# Patient Record
Sex: FEMALE | Race: WHITE
[De-identification: names, ages, dates, MRNs, and addresses within clinical notes are randomized per-mention and may not be internally consistent; named-entity substitution may affect disease eponyms.]

---

## 2017-04-21 ENCOUNTER — HOSPITAL ENCOUNTER (OUTPATIENT)
Dept: HOSPITAL 56 - MW.CHOBGYN | Age: 27
End: 2017-04-21
Attending: ADVANCED PRACTICE MIDWIFE
Payer: COMMERCIAL

## 2017-04-21 DIAGNOSIS — Z34.90: Primary | ICD-10-CM

## 2017-05-05 ENCOUNTER — HOSPITAL ENCOUNTER (OUTPATIENT)
Dept: HOSPITAL 56 - MW.US | Age: 27
End: 2017-05-05
Attending: ADVANCED PRACTICE MIDWIFE
Payer: COMMERCIAL

## 2017-05-05 DIAGNOSIS — Z34.90: Primary | ICD-10-CM

## 2017-05-08 NOTE — US
EXAM DATE: 17



PATIENT'S AGE: 26



Patient: CARLYLE LUIS



Facility: [place], [place]

Patient ID: [id number]

Site Patient ID: [id number].

Site Accession #: [id number].

: 1990

Study:  OB Pelvis [id number]/5/2017 4:39:43 PM

Ordering Physician: Cesia Renee



Final Report: 

INDICATION:

Fetal anatomical survey 



TECHNIQUE:

Ultrasound OB pelvis transabdominal. Real-time gray-scale imaging of the fetus 
was performed as well as color Doppler and spectral Doppler analysis of the 
umbilical artery.



COMPARISON:

None.



FINDINGS:

Sonographic imaging demonstrates a single living intrauterine gestation. Fetus 
demonstrates a regular cardiac rate of 142 beats per minute. Fetus has a 
cephalic orientation. The placenta lies posterior with borderline previa. 
Amniotic fluid volume appears normal. 



FETAL ANATOMICAL SURVEY:

Lateral ventricles: Normal

Posterior fossa: Normal

Face: Normal

Profile: Normal

Spine: Normal

4 chamber heart: Normal

RVOT: Normal

LVOT: Normal

Diaphragm: Normal

Stomach: Normal

Kidneys: Normal

Bladder: Normal

Cord insertion: Normal

3 vessel cord: Normal

4 extremities: Normal



BIOMETRY DATA:

Biparietal diameter: 5.1 cm, 21 weeks 4 days.

Head circumference: 19.3 cm, 21 weeks 3 days.

Abdominal circumference: 16.3 cm, 21 weeks 3 days.

Femur length: 3.4 cm, 20 weeks 6 days.

H/A ratio is 1.16 which is within the normal range.

EFW is 409 grams, 73%.



Gestational age by LMP is 20 weeks 5 days.

Gestational age by ultrasound is 21 weeks 2 days.

Estimated date of delivery by LMP is 2017.

Estimated date of delivery by ultrasound is 2017.



IMPRESSION:

1.Single viable intrauterine pregnancy. 

2.No intrinsic abnormalities noted on anatomic survey. 

3. Posterior placenta is low-lying. This should be further evaluated on 
subsequent ultrasound exams.



Dictated by Pepito Wray MD @ May 8 2017 7:51AM

(Electronic Signature)



Report Signed by Proxy.
DAY

## 2017-09-14 ENCOUNTER — HOSPITAL ENCOUNTER (INPATIENT)
Dept: HOSPITAL 56 - MW.OBCHECK | Age: 27
LOS: 1 days | Discharge: HOME | End: 2017-09-15
Attending: OBSTETRICS & GYNECOLOGY | Admitting: OBSTETRICS & GYNECOLOGY
Payer: COMMERCIAL

## 2017-09-14 DIAGNOSIS — Z3A.39: ICD-10-CM

## 2017-09-14 NOTE — PCM.LDHP
L&D History of Present Illness





- General


Date of Service: 17


Admit Problem/Dx: 


 Patient Status Order with Admit Dx/Problem





17 13:43


Patient Status [ADT] Routine 








 Admission Diagnosis/Problem











Admission Diagnosis/Problem    Pregnant - planned














17 17:45


27 yo  EDC 2017 at 39 3/7wks/ O neg, RI, GBS neg. Comes to L&D in 

active labor.


Source of Information: Patient


History Limitations: Reports: No Limitations





- History of Present Illness


Pain Score: 5


Improves with: Reports: None


Worsens with: Reports: None


Associated Symptoms: Reports: N





- Related Data


Allergies/Adverse Reactions: 


 Allergies











Allergy/AdvReac Type Severity Reaction Status Date / Time


 


No Known Allergies Allergy   Verified 17 13:40














Past Medical History


OB/GYN History: Reports: Pregnancy





- Infectious Disease History


Infectious Disease History: Reports: Chicken Pox, Mononucleosis





- Past Surgical History


HEENT Surgical History: Reports: Oral Surgery


Musculoskeletal Surgical History: Reports: Shoulder Surgery


Other Musculoskeletal Surgeries/Procedures:: Right shoulder surgery x2.





Social & Family History





- Family History


Cardiac: Reports: Hypertension, MI


OBGYN: Reports: Pregnancy


Psychiatric: Reports: Schizophrenia


Oncologic: Reports: Breast





- Tobacco Use


Smoking Status *Q: Never Smoker


Second Hand Smoke Exposure: No





- Caffeine Use


Caffeine Use: Reports: Coffee





- Recreational Drug Use


Recreational Drug Use: No





H&P Review of Systems





- Review of Systems:


Review Of Systems: See Below


General: Reports: No Symptoms


HEENT: Reports: No Symptoms


Pulmonary: Reports: No Symptoms


Cardiovascular: Reports: No Symptoms


Gastrointestinal: Reports: No Symptoms


Genitourinary: Reports: No Symptoms


Musculoskeletal: Reports: No Symptoms


Skin: Reports: No Symptoms


Psychiatric: Reports: No Symptoms


Neurological: Reports: No Symptoms


Hematologic/Lymphatic: Reports: No Symptoms


Immunologic: Reports: No Symptoms





L&D Exam





- Exam


Exam: See Below





- Vital Signs


Weight: 95.254 kg





- OB Specific


Contraction Intensity: Strong


Fetal Movement: Active


Fetal Heart Tones: Present


Birth Presentation: Vertex





- Exam


General: Alert, Oriented, Cooperative


HEENT: Hearing Intact


Lungs: Normal Respiratory Effort


GI/Abdominal Exam: Soft, Non-Tender, No Organomegaly (gravid)


Rectal Exam: Deferred


Genitourinary: Cervical dilitation


Back Exam: Full Range of Motion


Extremities: Normal Range of Motion, Non-Tender, No Pedal Edema, Normal 

Capillary Refill


Skin: Warm, Dry, Intact


Neurological: Cranial Nerves Intact


Psychiatric: Alert, Normal Affect, Normal Mood





- Patient Data


Lab Results Last 24 hrs: 


 Laboratory Results - last 24 hr











  17 Range/Units





  14:29 14:29 


 


WBC  17.82 H   (4.0-11.0)  K/uL


 


RBC  4.72   (4.30-5.90)  M/uL


 


Hgb  12.3   (12.0-16.0)  g/dL


 


Hct  38.0   (36.0-46.0)  %


 


MCV  80.5   (80.0-98.0)  fL


 


MCH  26.1 L   (27.0-32.0)  pg


 


MCHC  32.4   (31.0-37.0)  g/dL


 


RDW Std Deviation  41.1   (28.0-62.0)  fl


 


RDW Coeff of Burton  14   (11.0-15.0)  %


 


Plt Count  246   (150-400)  K/uL


 


MPV  10.90   (7.40-12.00)  fL


 


Nucleated RBC %  0.0   /100WBC


 


Nucleated RBCs #  0   K/uL


 


Blood Type   O NEGATIVE  


 


Antibody Screen   NEGATIVE  











Result Diagrams: 


 17 14:29








- Problem List


(1) Supervision of normal IUP (intrauterine pregnancy) in primigravida


SNOMED Code(s): 78802605, 010033452, 620194403, 548740940


   ICD Code: Z34.00 - ENCNTR FOR SUPRVSN OF NORMAL FIRST PREGNANCY, UNSP 

TRIMESTER   Status: Acute   Priority: High   Current Visit: Yes   


Qualifiers: 


   Trimester: third trimester   Qualified Code(s): Z34.03 - Encounter for 

supervision of normal first pregnancy, third trimester   


Problem List Initiated/Reviewed/Updated: Yes


Orders Last 24hrs: 


 Active Orders 24 hr











 Category Date Time Status


 


 May Shower [RC] ASDIRECTED Care  17 14:07 Inactive


 


 May Shower [RC] ASDIRECTED Care  17 15:47 Active


 


 Notify Provider [RC] PRN Care  17 14:07 Inactive


 


 Up ad Mary [RC] ASDIRECTED Care  17 13:43 Inactive


 


 Up ad Mary [RC] ASDIRECTED Care  17 14:07 Inactive


 


 Up ad Mary [RC] ASDIRECTED Care  17 15:47 Active


 


 Vital Signs [RC] PER UNIT ROUTINE Care  17 13:43 Inactive


 


 Vital Signs [RC] PER UNIT ROUTINE Care  17 14:07 Inactive


 


 Vital Signs [RC] PER UNIT ROUTINE Care  17 15:47 Active


 


 Regular Diet [DIET] Diet  17 Dinner Active


 


 RHIG WORKUP, POSTPARTUM [BBK] Routine Lab  17 16:53 Received


 


 Acetaminophen [Tylenol Extra Strength] Med  17 15:47 Active





 1,000 mg PO Q4H PRN   


 


 Acetaminophen [Tylenol Extra Strength] Med  17 15:47 Active





 500 mg PO Q4H PRN   


 


 Benzocaine/Menthol [Dermoplast Pain Relief 20%-0.5% Med  17 15:47 Active





 Spray]   





 78 gm TOP ASDIRECTED PRN   


 


 Bisacodyl [Dulcolax] Med  17 15:47 Active





 10 mg RECTAL .ONCE PRN   


 


 Docusate Sodium [Colace] Med  17 15:47 Active





 100 mg PO BID PRN   


 


 Hydrocortisone [Proctozone-HC 2.5% Crm] Med  17 19:00 Active





 1 gm TOP 5XDAY   


 


 Ibuprofen [Motrin] Med  17 15:47 Active





 400 mg PO Q4H PRN   


 


 Ibuprofen [Motrin] Med  17 15:47 Active





 800 mg PO Q6H PRN   


 


 Lanolin [Lansinoh HPA] Med  17 15:47 Active





 See Dose Instructions  TOP ASDIRECTED PRN   


 


 Witch Hazel [Tucks] Med  17 15:47 Active





 1 pad TOP ASDIRECTED PRN   


 


 oxyCODONE Med  17 15:47 Active





 5 mg PO Q2H PRN   


 


 Assess Lochia [WOMSER] Per Unit Routine Oth  17 15:47 Ordered


 


 Assess Uterine Involution [WOMSER] Per Unit Routine Oth  17 15:47 Ordered


 


 Peripheral IV Discontinue [OM.PC] Routine Oth  17 15:47 Ordered


 


 Resuscitation Status Routine Resus Stat  17 15:47 Ordered








 Medication Orders





Acetaminophen (Tylenol Extra Strength)  500 mg PO Q4H PRN


   PRN Reason: Pain


Acetaminophen (Tylenol Extra Strength)  1,000 mg PO Q4H PRN


   PRN Reason: Pain


Benzocaine/Menthol (Dermoplast Pain Relief 20%-0.5% Spray)  78 gm TOP 

ASDIRECTED PRN


   PRN Reason: Perineal Comfort Measure


   Last Admin: 17 16:05  Dose: 78 gm


Bisacodyl (Dulcolax)  10 mg RECTAL .ONCE PRN


   PRN Reason: Constipation


Docusate Sodium (Colace)  100 mg PO BID PRN


   PRN Reason: Constipation


Emollient Ointment (Lansinoh Hpa)  0 gm TOP ASDIRECTED PRN


   PRN Reason: Sore Nipples


Hydrocortisone (Proctozone-Hc 2.5% Crm)  1 gm TOP 5XDAY NATASHA


Ibuprofen (Motrin)  400 mg PO Q4H PRN


   PRN Reason: Pain


Ibuprofen (Motrin)  800 mg PO Q6H PRN


   PRN Reason: Pain


   Last Admin: 17 16:06  Dose: 800 mg


Oxycodone HCl (Oxycodone)  5 mg PO Q2H PRN


   PRN Reason: Pain


Witch Hazel (Tucks)  1 pad TOP ASDIRECTED PRN


   PRN Reason: comfort care


   Last Admin: 17 16:05  Dose: 1 tub








Assessment/Plan Comment:: 





Labor


A: 27 yo  EDC 2017 at 39 3/7wks/ O neg, RI, GBS neg. Comes to L&D in 

active labor.


P: Admit, epidural prn, anticipate NATALIE, Dr Guadalupe updated on pt status

## 2017-09-14 NOTE — PCM.DEL
L & D Note





- General Info


Date of Service: 17


Mother's Due Date: 17





- Delivery Note


Labor: Spontaneous


Delivery Outcome: Livebirth


Infant Delivery Method: Spontaneous Vaginal Delivery-Single


Birth Presentation: Vertex


Nuchal Cord: None


Anesthesia Type: None


Episiotomy Type: None


Laceration: None


Placenta: Intact, Spontaneous


Cord: 3 Vessels


Estimated Blood Loss: 100


Resuscitation Needed: No


[place]: Stimulated


APGAR Score 1 min: 8


APGAR Score 5 min: 10


Second Stage Interventions: Reports: Pushing Effectively, Pushing, Pulls Own 

Legs Back


Delivery Comments (Free Text/Narrative):: 





 of viable male over intact perineum. Head delivered with good pushing, 

shoulder and body followed with good maternal effort. Infant spot cough and cry 

placed on mothers abdomen with RN at  for evaluation. Delayed cord clamping. 

Pitocin to IVF. Cord clamped and cut by FOB. Cord blood collected. Placenta 

delivered grossly intact with gentle traction. Inspection noted intact perineum 

and small upper labial lac that were hemo stable and no repair needed. EBL 100cc

, APGARS 8/10, Wt: 9lb 7o 4270gm, Mother and infant left in stable condition 

for recovery bonding well.





- General Info


Date of Service: 17


Admission Dx/Problem (Free Text): 


 Patient Status Order with Admit Dx/Problem





17 13:43


Patient Status [ADT] Routine 








 Admission Diagnosis/Problem











Admission Diagnosis/Problem    Pregnant - planned














17 17:45


25 yo  EDC 2017 at 39 3/7wks/ O neg, RI, GBS neg. Comes to L&D in 

active labor.


Functional Status: Reports: Pain Controlled





- Review of Systems


General: Reports: No Symptoms


HEENT: Reports: No Symptoms


Pulmonary: Reports: No Symptoms


Cardiovascular: Reports: No Symptoms


Gastrointestinal: Reports: No Symptoms


Genitourinary: Reports: No Symptoms


Musculoskeletal: Reports: No Symptoms


Skin: Reports: No Symptoms


Neurological: Reports: No Symptoms


Psychiatric: Reports: No Symptoms





- Patient Data


Weight - Most Recent: 95.254 kg


Lab Results Last 24 Hours: 


 Laboratory Results - last 24 hr











  17 Range/Units





  14:29 14:29 


 


WBC  17.82 H   (4.0-11.0)  K/uL


 


RBC  4.72   (4.30-5.90)  M/uL


 


Hgb  12.3   (12.0-16.0)  g/dL


 


Hct  38.0   (36.0-46.0)  %


 


MCV  80.5   (80.0-98.0)  fL


 


MCH  26.1 L   (27.0-32.0)  pg


 


MCHC  32.4   (31.0-37.0)  g/dL


 


RDW Std Deviation  41.1   (28.0-62.0)  fl


 


RDW Coeff of Burton  14   (11.0-15.0)  %


 


Plt Count  246   (150-400)  K/uL


 


MPV  10.90   (7.40-12.00)  fL


 


Nucleated RBC %  0.0   /100WBC


 


Nucleated RBCs #  0   K/uL


 


Blood Type   O NEGATIVE  


 


Antibody Screen   NEGATIVE  











Med Orders - Current: 


 Current Medications





Acetaminophen (Tylenol Extra Strength)  500 mg PO Q4H PRN


   PRN Reason: Pain


Acetaminophen (Tylenol Extra Strength)  1,000 mg PO Q4H PRN


   PRN Reason: Pain


Benzocaine/Menthol (Dermoplast Pain Relief 20%-0.5% Spray)  78 gm TOP 

ASDIRECTED PRN


   PRN Reason: Perineal Comfort Measure


   Last Admin: 17 16:05 Dose:  78 gm


Bisacodyl (Dulcolax)  10 mg RECTAL .ONCE PRN


   PRN Reason: Constipation


Docusate Sodium (Colace)  100 mg PO BID PRN


   PRN Reason: Constipation


Emollient Ointment (Lansinoh Hpa)  0 gm TOP ASDIRECTED PRN


   PRN Reason: Sore Nipples


Hydrocortisone (Proctozone-Hc 2.5% Crm)  1 gm TOP 5XDAY NATASHA


Ibuprofen (Motrin)  400 mg PO Q4H PRN


   PRN Reason: Pain


Ibuprofen (Motrin)  800 mg PO Q6H PRN


   PRN Reason: Pain


   Last Admin: 17 16:06 Dose:  800 mg


Oxycodone HCl (Oxycodone)  5 mg PO Q2H PRN


   PRN Reason: Pain


Witch Hazel (Tucks)  1 pad TOP ASDIRECTED PRN


   PRN Reason: comfort care


   Last Admin: 17 16:05 Dose:  1 tub





Discontinued Medications





Butorphanol Tartrate (Stadol)  1 mg IVPUSH Q1H PRN


   PRN Reason: Pain


Carboprost Tromethamine (Hemabate Ds)  250 mcg IM ASDIRECTED PRN


   PRN Reason: Post Partum Hemorrhage


Lactated Ringer's (Ringers, Lactated)  1,000 mls @ 150 mls/hr IV ASDIRECTED NATASHA


   Last Admin: 17 14:45 Dose:  150 mls/hr


Oxytocin/Lactated Ringer's (Pitocin In Lr 30 Units/500 Ml)  30 unit in 500 mls 

@ 999 mls/hr IV TITRATE NATASHA; 999 MUNITS/MIN


   PRN Reason: Protocol


   Stop: 17 14:46


   Last Admin: 17 15:27 Dose:  999 munits/min, 999 mls/hr


Lidocaine HCl (Xylocaine 1%)  50 ml INJECT .ONCE PRN


   PRN Reason: Laceration repair


Methylergonovine Maleate (Methergine)  0.2 mg IM ASDIRECTED PRN


   PRN Reason: Post Partum Hemorrhage


Misoprostol (Cytotec)  200 mcg PO .ONCE PRN


   PRN Reason: Post Partum Hemorrhage


Nalbuphine HCl (Nubain)  10 mg IVPUSH Q1H PRN


   PRN Reason: Pain (severe 7-10)


Sodium Chloride (Saline Flush)  10 ml FLUSH ASDIRECTED PRN


   PRN Reason: Keep Vein Open


Sodium Chloride (Saline Flush)  2.5 ml FLUSH ASDIRECTED PRN


   PRN Reason: Keep Vein Open


Sterile Water (Sterile Water For Irrigation)  1,000 ml IRR ASDIRECTED PRN


   PRN Reason: delivery


   Last Admin: 17 15:27 Dose:  1,000 ml











- Exam


General: Alert, Oriented, Cooperative, No Acute Distress


Lungs: Normal Respiratory Effort


GI/Abdominal Exam: Soft, Non-Tender, No Organomegaly, No Distention, No Mass, 

Pelvis Stable


 (Female) Exam: Normal External Exam, Normal Bimanual Exam, Vaginal Bleeding


Back Exam: Full Range of Motion


Extremities: Normal Range of Motion, Non-Tender, No Pedal Edema, Normal 

Capillary Refill


Skin: Warm, Dry, Intact


Wound/Incisions: Healing Well


Neurological: No New Focal Deficit, Normal Speech, Normal Tone


Psy/Mental Status: Alert, Normal Affect, Normal Mood





- Problem List & Annotations


(1) Supervision of normal IUP (intrauterine pregnancy) in primigravida


SNOMED Code(s): 45879942, 369820542, 391785528, 850822247


   Code(s): Z34.00 - ENCNTR FOR SUPRVSN OF NORMAL FIRST PREGNANCY, UNSP 

TRIMESTER   Status: Acute   Priority: High   Current Visit: Yes   


Qualifiers: 


   Trimester: third trimester   Qualified Code(s): Z34.03 - Encounter for 

supervision of normal first pregnancy, third trimester   





(2)  (normal spontaneous vaginal delivery)


SNOMED Code(s): 69960183


   Code(s): O80 - ENCOUNTER FOR FULL-TERM UNCOMPLICATED DELIVERY   Status: 

Acute   Priority: High   Current Visit: Yes   





- Problem List Review


Problem List Initiated/Reviewed/Updated: Yes





- My Orders


Last 24 Hours: 


My Active Orders





17 14:07


May Shower [RC] ASDIRECTED 


Notify Provider [RC] PRN 


Up ad Mary [RC] ASDIRECTED 


Vital Signs [RC] PER UNIT ROUTINE 





17 15:47


May Shower [RC] ASDIRECTED 


Up ad Mary [RC] ASDIRECTED 


Vital Signs [RC] PER UNIT ROUTINE 


Acetaminophen [Tylenol Extra Strength]   1,000 mg PO Q4H PRN 


Acetaminophen [Tylenol Extra Strength]   500 mg PO Q4H PRN 


Benzocaine/Menthol [Dermoplast Pain Relief 20%-0.5% Spray]   78 gm TOP 

ASDIRECTED PRN 


Bisacodyl [Dulcolax]   10 mg RECTAL .ONCE PRN 


Docusate Sodium [Colace]   100 mg PO BID PRN 


Ibuprofen [Motrin]   400 mg PO Q4H PRN 


Ibuprofen [Motrin]   800 mg PO Q6H PRN 


Lanolin [Lansinoh HPA]   See Dose Instructions  TOP ASDIRECTED PRN 


Witch Hazel [Tucks]   1 pad TOP ASDIRECTED PRN 


oxyCODONE   5 mg PO Q2H PRN 


Assess Lochia [WOMSER] Per Unit Routine 


Assess Uterine Involution [WOMSER] Per Unit Routine 


Peripheral IV Discontinue [OM.PC] Routine 


Resuscitation Status Routine 





17 16:53


RHIG WORKUP, POSTPARTUM [BBK] Routine 





17 19:00


Hydrocortisone [Proctozone-HC 2.5% Crm]   1 gm TOP 5XDAY 





17 Dinner


Regular Diet [DIET] 














- Assessment


Assessment:: 





Delivery:


A:  of viable male over intact perineum. APGARS 8/10, Wt: 9lb 7oz, 4270gm, 

Intact perineum intact, EBL 100cc, Stable





- Plan


Plan:: 





Labor


A: 25 yo  EDC 2017 at 39 3/7wks/ O neg, RI, GBS neg. Comes to L&D in 

active labor.


P: Admit, epidural prn, anticipate SVE, Dr Guadalupe updated on pt status





Delivery


P: Routine pp plan of care

## 2017-09-15 VITALS — DIASTOLIC BLOOD PRESSURE: 72 MMHG | SYSTOLIC BLOOD PRESSURE: 118 MMHG

## 2017-09-15 NOTE — PCM.DCSUM1
**Discharge Summary





- Hospital Course


Free Text/Narrative:: 





Discharge home with infant, follow up 6 weeks or sooner if needed.





- Discharge Data


Discharge Date: 09/15/17


Discharge Disposition: Home, Self-Care 01


Condition: Good





- Discharge Diagnosis/Problem(s)


(1) Supervision of normal IUP (intrauterine pregnancy) in primigravida


SNOMED Code(s): 52939755, 417611249, 924405978, 311884972


   ICD Code: Z34.00 - ENCNTR FOR SUPRVSN OF NORMAL FIRST PREGNANCY, UNSP 

TRIMESTER   Status: Acute   Priority: High   Current Visit: Yes   


Qualifiers: 


   Trimester: third trimester   Qualified Code(s): Z34.03 - Encounter for 

supervision of normal first pregnancy, third trimester   





(2)  (normal spontaneous vaginal delivery)


SNOMED Code(s): 80885905


   ICD Code: O80 - ENCOUNTER FOR FULL-TERM UNCOMPLICATED DELIVERY   Status: 

Acute   Priority: High   Current Visit: Yes   





- Patient Instructions


Diet: Usual Diet as Tolerated


Activity: As Tolerated, Rest and Relax Today


Driving: May Drive Today


Showering/Bathing: May Shower


Notify Provider of: Fever, Increased Pain, Drainage, Nausea and/or Vomiting


Other/Special Instructions: Discharge home with infant, follow up 6 weeks or 

sooner if needed.





- Discharge Plan


Referrals: 


[facility] [Outside]


Thelma Callaway CNM [Mid-Wife] - 10/26/17 3:00 pm





- General Info


Date of Service: 09/15/17


Admission Dx/Problem (Free Text: 


 Patient Status Order with Admit Dx/Problem





17 13:43


Patient Status [ADT] Routine 








 Admission Diagnosis/Problem











Admission Diagnosis/Problem    Pregnant - planned














17 17:45


25 yo  EDC 2017 at 39 3/7wks/ O neg, RI, GBS neg. Comes to L&D in 

active labor.


Functional Status: Reports: Pain Controlled, Tolerating Diet, Ambulating, 

Urinating





- Review of Systems


General: Reports: No Symptoms


HEENT: Reports: No Symptoms


Pulmonary: Reports: No Symptoms


Cardiovascular: Reports: No Symptoms


Gastrointestinal: Reports: No Symptoms


Genitourinary: Reports: No Symptoms


Musculoskeletal: Reports: No Symptoms


Skin: Reports: No Symptoms


Neurological: Reports: No Symptoms


Psychiatric: Reports: No Symptoms





- Patient Data


Vitals - Most Recent: 


 Last Vital Signs











Temp  35.9 C   09/15/17 04:15


 


Pulse  84   09/15/17 04:15


 


Resp  18   09/15/17 04:15


 


BP  130/88   09/15/17 04:15


 


Pulse Ox  100   09/15/17 04:15











Weight - Most Recent: 95.254 kg


Lab Results - Last 24 hrs: 


 Laboratory Results - last 24 hr











  17 Range/Units





  14:29 14:29 16:53 


 


WBC  17.82 H    (4.0-11.0)  K/uL


 


RBC  4.72    (4.30-5.90)  M/uL


 


Hgb  12.3    (12.0-16.0)  g/dL


 


Hct  38.0    (36.0-46.0)  %


 


MCV  80.5    (80.0-98.0)  fL


 


MCH  26.1 L    (27.0-32.0)  pg


 


MCHC  32.4    (31.0-37.0)  g/dL


 


RDW Std Deviation  41.1    (28.0-62.0)  fl


 


RDW Coeff of [place]  14    (11.0-15.0)  %


 


Plt Count  246    (150-400)  K/uL


 


MPV  10.90    (7.40-12.00)  fL


 


Nucleated RBC %  0.0    /100WBC


 


Nucleated RBCs #  0    K/uL


 


Blood Type   O NEGATIVE   


 


Antibody Screen   NEGATIVE   


 


Fetal Screen    NEGATIVE  


 


RhIG Candidate?    YES  


 


Rhogam Indicated    YES, BABY RH POS H  











Med Orders - Current: 


 Current Medications





Acetaminophen (Tylenol Extra Strength)  500 mg PO Q4H PRN


   PRN Reason: Pain


Acetaminophen (Tylenol Extra Strength)  1,000 mg PO Q4H PRN


   PRN Reason: Pain


Benzocaine/Menthol (Dermoplast Pain Relief 20%-0.5% Spray)  78 gm TOP 

ASDIRECTED PRN


   PRN Reason: Perineal Comfort Measure


   Last Admin: 17 16:05 Dose:  78 gm


Bisacodyl (Dulcolax)  10 mg RECTAL .ONCE PRN


   PRN Reason: Constipation


Docusate Sodium (Colace)  100 mg PO BID PRN


   PRN Reason: Constipation


   Last Admin: 17 21:51 Dose:  100 mg


Emollient Ointment (Lansinoh Hpa)  0 gm TOP ASDIRECTED PRN


   PRN Reason: Sore Nipples


   Last Admin: 17 23:41 Dose:  1 tube


Hydrocortisone (Proctozone-Hc 2.5% Crm)  1 gm TOP 5XDAY NATASHA


   Last Admin: 09/15/17 01:11 Dose:  Not Given


Ibuprofen (Motrin)  400 mg PO Q4H PRN


   PRN Reason: Pain


Ibuprofen (Motrin)  800 mg PO Q6H PRN


   PRN Reason: Pain


   Last Admin: 09/15/17 04:43 Dose:  800 mg


Oxycodone HCl (Oxycodone)  5 mg PO Q2H PRN


   PRN Reason: Pain


Witch Hazel (Tucks)  1 pad TOP ASDIRECTED PRN


   PRN Reason: comfort care


   Last Admin: 17 16:05 Dose:  1 tub





Discontinued Medications





Butorphanol Tartrate (Stadol)  1 mg IVPUSH Q1H PRN


   PRN Reason: Pain


Carboprost Tromethamine (Hemabate Ds)  250 mcg IM ASDIRECTED PRN


   PRN Reason: Post Partum Hemorrhage


Lactated Ringer's (Ringers, Lactated)  1,000 mls @ 150 mls/hr IV ASDIRECTED NATASHA


   Last Admin: 17 14:45 Dose:  150 mls/hr


Oxytocin/Lactated Ringer's (Pitocin In Lr 30 Units/500 Ml)  30 unit in 500 mls 

@ 999 mls/hr IV TITRATE NATASHA; 999 MUNITS/MIN


   PRN Reason: Protocol


   Stop: 17 14:46


   Last Admin: 17 15:27 Dose:  999 munits/min, 999 mls/hr


Lidocaine HCl (Xylocaine 1%)  50 ml INJECT .ONCE PRN


   PRN Reason: Laceration repair


Methylergonovine Maleate (Methergine)  0.2 mg IM ASDIRECTED PRN


   PRN Reason: Post Partum Hemorrhage


Misoprostol (Cytotec)  200 mcg PO .ONCE PRN


   PRN Reason: Post Partum Hemorrhage


Nalbuphine HCl (Nubain)  10 mg IVPUSH Q1H PRN


   PRN Reason: Pain (severe 7-10)


Sodium Chloride (Saline Flush)  10 ml FLUSH ASDIRECTED PRN


   PRN Reason: Keep Vein Open


Sodium Chloride (Saline Flush)  2.5 ml FLUSH ASDIRECTED PRN


   PRN Reason: Keep Vein Open


Sterile Water (Sterile Water For Irrigation)  1,000 ml IRR ASDIRECTED PRN


   PRN Reason: delivery


   Last Admin: 17 15:27 Dose:  1,000 ml











- Exam


General: Reports: Alert, Oriented, Cooperative, No Acute Distress


Lungs: Reports: Normal Respiratory Effort


GI/Abdominal Exam: Soft, Non-Tender, No Organomegaly, No Distention, No Mass, 

Pelvis Stable


 (Female) Exam: Vaginal Bleeding


Rectal (Female) Exam: Deferred


Back Exam: Reports: Full Range of Motion


Extremities: Normal Range of Motion, Non-Tender, No Pedal Edema, Normal 

Capillary Refill


Skin: Reports: Warm, Dry, Intact


Wound/Incisions: Reports: Healing Well


Neurological: Reports: No New Focal Deficit, Normal Speech, Normal Tone


Psy/Mental Status: Reports: Alert, Normal Affect, Normal Mood





*Q Meaningful Use (DIS)





- VTE *Q


VTE Criteria *Q: 








- Stroke *Q


Stroke Criteria *Q: 








- AMI *Q


AMI Criteria *Q:

## 2019-01-16 ENCOUNTER — HOSPITAL ENCOUNTER (INPATIENT)
Dept: HOSPITAL 56 - MW.OBCHECK | Age: 29
LOS: 1 days | Discharge: HOME | End: 2019-01-17
Attending: OBSTETRICS & GYNECOLOGY | Admitting: OBSTETRICS & GYNECOLOGY
Payer: COMMERCIAL

## 2019-01-16 DIAGNOSIS — Z3A.39: ICD-10-CM

## 2019-01-16 NOTE — PCM.LDHP
L&D History of Present Illness





- General


Date of Service: 01/16/19


Admit Problem/Dx: 


 Patient Status Order with Admit Dx/Problem





01/16/19 19:37


Patient Status [ADT] Routine 





01/16/19 20:14


Patient Status [ADT] Routine 








 Admission Diagnosis/Problem











Admission Diagnosis/Problem    Pregnancy














Source of Information: Patient


History Limitations: Reports: No Limitations





- History of Present Illness


Improves with: Reports: None


Worsens with: Reports: None


Associated Symptoms: Reports: N





- Related Data


Allergies/Adverse Reactions: 


 Allergies











Allergy/AdvReac Type Severity Reaction Status Date / Time


 


No Known Allergies Allergy   Verified 09/14/17 13:40














Past Medical History


OB/GYN History: Reports: Pregnancy





- Infectious Disease History


Infectious Disease History: Reports: Chicken Pox, Mononucleosis





- Past Surgical History


HEENT Surgical History: Reports: Oral Surgery


Musculoskeletal Surgical History: Reports: Shoulder Surgery


Other Musculoskeletal Surgeries/Procedures:: Right shoulder surgery x2.





Social & Family History





- Family History


Cardiac: Reports: Hypertension, MI


OBGYN: Reports: Pregnancy


Psychiatric: Reports: Schizophrenia


Oncologic: Reports: Breast





- Caffeine Use


Caffeine Use: Reports: Coffee





H&P Review of Systems





- Review of Systems:


Review Of Systems: See Below


General: Reports: No Symptoms


HEENT: Reports: No Symptoms


Pulmonary: Reports: No Symptoms


Cardiovascular: Reports: No Symptoms


Gastrointestinal: Reports: No Symptoms


Genitourinary: Reports: No Symptoms


Musculoskeletal: Reports: No Symptoms


Skin: Reports: No Symptoms


Psychiatric: Reports: No Symptoms


Neurological: Reports: No Symptoms


Hematologic/Lymphatic: Reports: No Symptoms


Immunologic: Reports: No Symptoms





L&D Exam





- Exam


Exam: See Below





- OB Specific


Fundal Height In cm: 38


Contraction Intensity: Moderate to Strong


Fetal Movement: Active


Fetal Heart Tones: Present


Birth Presentation: Vertex





- De La Torre Score


De La Torre Score Cervix Position: Anterior


De La Torre Score Effacement: >80%


De La Torre Score Dilation: > 5 cm


De La Torre Score Infant's Station: -1 ,0





- Exam


General: Alert, Oriented


HEENT: PERRLA, Conjunctiva Clear, EACs Clear, EOMI, Hearing Intact, Mucosa 

Moist & Pink, Nares Patent, Normal Nasal Septum, Posterior Pharynx Clear, TMs 

Clear


Neck: Supple, Trachea Midline


Lungs: Clear to Auscultation, Normal Respiratory Effort


Cardiovascular: Regular Rate, Regular Rhythm


GI/Abdominal Exam: Normal Bowel Sounds, Soft, Non-Tender, No Organomegaly, No 

Distention, No Abnormal Bruit, No Mass, Pelvis Stable


Rectal Exam: Normal Exam, Normal Rectal Tone


Genitourinary: Normal external exam, Normal bimanual exam, Normal speculum exam


Back Exam: Normal Inspection, Full Range of Motion


Extremities: Normal Inspection, Normal Range of Motion, Non-Tender, No Pedal 

Edema, Normal Capillary Refill


Skin: Warm, Dry, Intact


Neurological: Cranial Nerves Intact, Reflexes Equal Bilateral


Psychiatric: Alert, Normal Affect, Normal Mood


Problem List Initiated/Reviewed/Updated: Yes


Orders Last 24hrs: 


 Active Orders 24 hr











 Category Date Time Status


 


 Patient Status [ADT] Routine ADT  01/16/19 20:14 Ordered


 


 Fetal Heart Tones [RC] CONTINUOUS Care  01/16/19 19:37 Active


 


 Fetal Non Stress Test [RC] PER UNIT ROUTINE Care  01/16/19 19:37 Active


 


 May Shower [RC] ASDIRECTED Care  01/16/19 19:37 Active


 


 May Shower [RC] ASDIRECTED Care  01/16/19 20:14 Ordered


 


 Notify Provider [RC] PRN Care  01/16/19 19:37 Active


 


 Up ad Mary [RC] ASDIRECTED Care  01/16/19 19:37 Active


 


 Up ad Mray [RC] ASDIRECTED Care  01/16/19 20:14 Ordered


 


 Vaginal Exam [RC] PRN Care  01/16/19 19:37 Active


 


 Vital Signs [RC] PER UNIT ROUTINE Care  01/16/19 19:37 Active


 


 Vital Signs [RC] PER UNIT ROUTINE Care  01/16/19 20:14 Ordered


 


 Clear Liquid Diet [DIET] Diet  01/17/19 Breakfast Active


 


 CBC W/O DIFF,HEMOGRAM [HEME] Routine Lab  01/16/19 19:37 Ordered


 


 HEMOGLOBIN/HEMATOCRIT,HH [HEME] Timed Lab  01/17/19 05:11 Ordered


 


 TYPE AND SCREEN [BBK] Routine Lab  01/16/19 19:37 Ordered


 


 Acetaminophen [Tylenol Extra Strength] Med  01/16/19 20:14 Ordered





 1,000 mg PO Q4H PRN   


 


 Acetaminophen [Tylenol Extra Strength] Med  01/16/19 20:14 Ordered





 500 mg PO Q4H PRN   


 


 Benzocaine/Menthol [Dermoplast Pain Relief 20%-0.5% Med  01/16/19 20:14 Ordered





 Spray]   





 78 gm TOP ASDIRECTED PRN   


 


 Bisacodyl [Dulcolax] Med  01/16/19 20:14 Ordered





 10 mg RECTAL ONETIME PRN   


 


 Butorphanol [Stadol] Med  01/16/19 19:37 Active





 1 mg IVPUSH Q1H PRN   


 


 Carboprost Tromethamine [Hemabate DS] Med  01/16/19 19:37 Active





 250 mcg IM ASDIRECTED PRN   


 


 Docusate Sodium [Colace] Med  01/16/19 20:14 Ordered





 100 mg PO BID PRN   


 


 Ibuprofen [Motrin] Med  01/16/19 20:14 Ordered





 400 mg PO Q4H PRN   


 


 Ibuprofen [Motrin] Med  01/16/19 20:14 Ordered





 800 mg PO Q6H PRN   


 


 Lactated Ringers [Ringers, Lactated] 1,000 ml Med  01/16/19 19:45 Active





 IV ASDIRECTED   


 


 Lanolin [Lansinoh HPA] Med  01/16/19 20:14 Ordered





 See Dose Instructions  TOP ASDIRECTED PRN   


 


 Lidocaine 1% [Xylocaine 1%] Med  01/16/19 19:37 Active





 50 ml INJECT ONETIME PRN   


 


 Methylergonovine [Methergine] Med  01/16/19 19:37 Active





 0.2 mg IM ASDIRECTED PRN   


 


 Nalbuphine [Nubain] Med  01/16/19 19:37 Active





 10 mg IVPUSH Q1H PRN   


 


 Oxytocin/0.9 % Sodium Chloride [Oxytocin 30 Unit/500 ML Med  01/16/19 19:45 

Active





 -NS]   





 30 unit in 500 ml IV TITRATE   


 


 Sodium Chloride 0.9% [Saline Flush] Med  01/16/19 19:37 Active





 10 ml FLUSH ASDIRECTED PRN   


 


 Sodium Chloride 0.9% [Saline Flush] Med  01/16/19 19:37 Active





 2.5 ml FLUSH ASDIRECTED PRN   


 


 Tranexamic Acid [Cyklokapron] 1,000 mg Med  01/16/19 19:37 Active





 Sodium Chloride 0.9% [Normal Saline] 100 ml   





 IV ONETIME   


 


 Water For Irrigation,Sterile [Sterile Water for Med  01/16/19 19:37 Active





 Irrigation]   





 1,000 ml IRR ASDIRECTED PRN   


 


 Witch Hazel [Tucks] Med  01/16/19 20:14 Ordered





 1 pad TOP ASDIRECTED PRN   


 


 miSOPROStol [Cytotec] Med  01/16/19 19:37 Active





 200 mcg PO ONETIME PRN   


 


 oxyCODONE Med  01/16/19 20:14 Ordered





 5 mg PO Q2H PRN   


 


 Assess Lochia [WOMSER] Per Unit Routine Ot  01/16/19 20:14 Ordered


 


 Assess Uterine Involution [WOMSER] Per Unit Routine Ot  01/16/19 20:14 Ordered


 


 Fetal Scalp Electrode [WOMSER] Per Unit Routine Ot  01/16/19 19:37 Ordered


 


 Peripheral IV Discontinue [OM.PC] Routine Oth  01/16/19 20:14 Ordered


 


 Peripheral IV Insertion Adult [OM.PC] Routine Ot  01/16/19 19:37 Ordered


 


 Resuscitation Status Routine Resus Stat  01/16/19 19:37 Ordered








 Medication Orders





Butorphanol Tartrate (Stadol)  1 mg IVPUSH Q1H PRN


   PRN Reason: Pain


Carboprost Tromethamine (Hemabate Ds)  250 mcg IM ASDIRECTED PRN


   PRN Reason: Post Partum Hemorrhage


Lactated Ringer's (Ringers, Lactated)  1,000 mls @ 150 mls/hr IV ASDIRECTED NATASHA


Oxytocin/Sodium Chloride (Oxytocin 30 Unit/500 Ml-Ns)  30 unit in 500 mls @ 500 

mls/hr IV TITRATE NATASHA


Tranexamic Acid 1,000 mg/ (Sodium Chloride)  110 mls @ 660 mls/hr IV ONETIME PRN


   PRN Reason: Bleeding


Lidocaine HCl (Xylocaine 1%)  50 ml INJECT ONETIME PRN


   PRN Reason: Laceration repair


Methylergonovine Maleate (Methergine)  0.2 mg IM ASDIRECTED PRN


   PRN Reason: Post Partum Hemorrhage


Misoprostol (Cytotec)  200 mcg PO ONETIME PRN


   PRN Reason: Post Partum Hemorrhage


Nalbuphine HCl (Nubain)  10 mg IVPUSH Q1H PRN


   PRN Reason: Pain (severe 7-10)


Sodium Chloride (Saline Flush)  10 ml FLUSH ASDIRECTED PRN


   PRN Reason: Keep Vein Open


Sodium Chloride (Saline Flush)  2.5 ml FLUSH ASDIRECTED PRN


   PRN Reason: Keep Vein Open


Sterile Water (Sterile Water For Irrigation)  1,000 ml IRR ASDIRECTED PRN


   PRN Reason: delivery

## 2019-01-17 VITALS — DIASTOLIC BLOOD PRESSURE: 88 MMHG | SYSTOLIC BLOOD PRESSURE: 132 MMHG

## 2019-01-17 NOTE — PCM.DCSUM1
**Discharge Summary





- Hospital Course


Diagnosis: Stroke: No





- Discharge Data


Discharge Date: 01/17/19


Discharge Disposition: Home, Self-Care 01


Condition: Good





- Patient Instructions


Diet: Usual Diet as Tolerated


Activity: As Tolerated


Driving: Do Not Drive


Showering/Bathing: May Shower





- Discharge Plan





- Discharge Summary/Plan Comment


DC Time >30 min.: Yes





- General Info


Date of Service: 01/17/19


Functional Status: Reports: Pain Controlled





- Review of Systems


General: Reports: No Symptoms


HEENT: Reports: No Symptoms


Pulmonary: Reports: No Symptoms


Cardiovascular: Reports: No Symptoms


Gastrointestinal: Reports: No Symptoms


Genitourinary: Reports: No Symptoms


Musculoskeletal: Reports: No Symptoms


Skin: Reports: No Symptoms


Neurological: Reports: No Symptoms


Psychiatric: Reports: No Symptoms





- Patient Data


Vitals - Most Recent: 


 Last Vital Signs











Temp  35.9 C   01/17/19 05:05


 


Pulse  78   01/17/19 05:05


 


Resp  16   01/17/19 05:05


 


BP  120/77   01/17/19 05:05


 


Pulse Ox  98   01/17/19 05:05











Lab Results - Last 24 hrs: 


 Laboratory Results - last 24 hr











  01/16/19 01/16/19 01/17/19 Range/Units





  20:50 20:50 06:05 


 


WBC  11.28 H    (4.0-11.0)  K/uL


 


RBC  4.63    (4.30-5.90)  M/uL


 


Hgb  11.6 L   10.9 L  (12.0-16.0)  g/dL


 


Hct  35.3 L   34.4 L  (36.0-46.0)  %


 


MCV  76.2 L    (80.0-98.0)  fL


 


MCH  25.1 L    (27.0-32.0)  pg


 


MCHC  32.9    (31.0-37.0)  g/dL


 


RDW Std Deviation  44.5    (28.0-62.0)  fl


 


RDW Coeff of Burton  16 H    (11.0-15.0)  %


 


Plt Count  186    (150-400)  K/uL


 


MPV  10.80    (7.40-12.00)  fL


 


Nucleated RBC %  0.0    /100WBC


 


Nucleated RBCs #  0    K/uL


 


Blood Type   O NEGATIVE   


 


Antibody Screen   POSITIVE   


 


Antibody Identification   Inconclusive   











Med Orders - Current: 


 Current Medications





Acetaminophen (Tylenol Extra Strength)  500 mg PO Q4H PRN


   PRN Reason: Pain


Acetaminophen (Tylenol Extra Strength)  1,000 mg PO Q4H PRN


   PRN Reason: Pain


Benzocaine/Menthol (Dermoplast Pain Relief 20%-0.5% Spray)  78 gm TOP 

ASDIRECTED PRN


   PRN Reason: Perineal Comfort Measure


   Last Admin: 01/16/19 22:25 Dose:  1 can


Bisacodyl (Dulcolax)  10 mg RECTAL ONETIME PRN


   PRN Reason: Constipation


Butorphanol Tartrate (Stadol)  1 mg IVPUSH Q1H PRN


   PRN Reason: Pain


Carboprost Tromethamine (Hemabate Ds)  250 mcg IM ASDIRECTED PRN


   PRN Reason: Post Partum Hemorrhage


Docusate Sodium (Colace)  100 mg PO BID PRN


   PRN Reason: Constipation


Emollient Ointment (Lansinoh Hpa)  0 gm TOP ASDIRECTED PRN


   PRN Reason: Sore Nipples


   Last Admin: 01/16/19 22:24 Dose:  7 gm


Lactated Ringer's (Ringers, Lactated)  1,000 mls @ 150 mls/hr IV ASDIRECTED NATASHA


Oxytocin/Sodium Chloride (Oxytocin 30 Unit/500 Ml-Ns)  30 unit in 500 mls @ 500 

mls/hr IV TITRATE NATASHA


Tranexamic Acid 1,000 mg/ (Sodium Chloride)  110 mls @ 660 mls/hr IV ONETIME PRN


   PRN Reason: Bleeding


Ibuprofen (Motrin)  400 mg PO Q4H PRN


   PRN Reason: Pain


Ibuprofen (Motrin)  800 mg PO Q6H PRN


   PRN Reason: Pain


   Last Admin: 01/17/19 09:36 Dose:  800 mg


Lidocaine HCl (Xylocaine 1%)  50 ml INJECT ONETIME PRN


   PRN Reason: Laceration repair


Methylergonovine Maleate (Methergine)  0.2 mg IM ASDIRECTED PRN


   PRN Reason: Post Partum Hemorrhage


Misoprostol (Cytotec)  200 mcg PO ONETIME PRN


   PRN Reason: Post Partum Hemorrhage


Nalbuphine HCl (Nubain)  10 mg IVPUSH Q1H PRN


   PRN Reason: Pain (severe 7-10)


Oxycodone HCl (Oxycodone)  5 mg PO Q2H PRN


   PRN Reason: Pain


Sodium Chloride (Saline Flush)  10 ml FLUSH ASDIRECTED PRN


   PRN Reason: Keep Vein Open


Sodium Chloride (Saline Flush)  2.5 ml FLUSH ASDIRECTED PRN


   PRN Reason: Keep Vein Open


Sterile Water (Sterile Water For Irrigation)  1,000 ml IRR ASDIRECTED PRN


   PRN Reason: delivery


Witch Hazel (Tucks)  1 pad TOP ASDIRECTED PRN


   PRN Reason: comfort care





Discontinued Medications





Oxytocin (Pitocin) Confirm Administered Dose 10 unit .ROUTE .GroupTie-MED ONE


   Stop: 01/16/19 19:56


   Last Admin: 01/16/19 20:18 Dose:  10 unit











- Exam


General: Reports: Alert, Oriented


HEENT: Reports: Pupils Equal, Pupils Reactive, EOMI, Mucous Membr. Moist/Pink


Neck: Reports: Supple


Lungs: Reports: Clear to Auscultation, Normal Respiratory Effort


Cardiovascular: Reports: Regular Rate, Regular Rhythm


GI/Abdominal Exam: Normal Bowel Sounds, Soft, Non-Tender, No Organomegaly, No 

Distention, No Abnormal Bruit, No Mass, Pelvis Stable


 (Female) Exam: Normal External Exam, Normal Speculum Exam, Normal Bimanual 

Exam


Rectal (Female) Exam: Normal Exam, Normal Rectal Tone


Back Exam: Reports: Normal Inspection, Full Range of Motion


Extremities: Normal Inspection, Normal Range of Motion, Non-Tender, No Pedal 

Edema, Normal Capillary Refill


Skin: Reports: Warm, Dry, Intact


Wound/Incisions: Reports: Healing Well


Neurological: Reports: No New Focal Deficit


Psy/Mental Status: Reports: Alert, Normal Affect, Normal Mood

## 2019-01-17 NOTE — OR
SURGEON:

Real Guadalupe MD

 

DATE OF PROCEDURE:

 

A 28-year-old patient.  She is para 1-0-0-1.  She was seen primarily by our

nurse midwife.  She is term.  She is 39 plus weeks.  She came here in active

labor.  At the time of admission, she was 4 to 5 with keyon every 1 to 2

minutes.  Fetal heart rate __________ the next thing was the patient has moved

precipitously to be complete-complete and ready to push.  I was called, but I

did not arrive in time.  By the time I arrived, the fetus was delivered;

however, the placenta __________.  The fetus cried immediately.  The delivery

was attended by a nurse, and I delivered the placenta without any problem, and

there was no perineal or labial laceration.  Estimated blood loss is 250 to 300

mL.  A few minute of fetal heart rate was category I.  There was no complication

in the labor and the birth of this patient.

 

 

GHAZAL / AARON

DD:  01/16/2019 20:19:30

DT:  01/16/2019 23:53:43

Job #:  [id number]

## 2019-01-17 NOTE — PCM.PNPP
- General Info


Date of Service: 19


Functional Status: Reports: Pain Controlled





- Review of Systems


General: Reports: No Symptoms


HEENT: Reports: No Symptoms


Pulmonary: Reports: No Symptoms


Cardiovascular: Reports: No Symptoms


Gastrointestinal: Reports: No Symptoms


Genitourinary: Reports: No Symptoms


Musculoskeletal: Reports: No Symptoms


Skin: Reports: No Symptoms


Neurological: Reports: No Symptoms


Psychiatric: Reports: No Symptoms





- General Info


Date of Service: 19





- Patient Data


Vital Signs - Most Recent: 


 Last Vital Signs











Temp  35.9 C   19 05:05


 


Pulse  78   19 05:05


 


Resp  16   19 05:05


 


BP  120/77   19 05:05


 


Pulse Ox  98   19 05:05











Lab Results - Last 24 Hours: 


 Laboratory Results - last 24 hr











  19 Range/Units





  20:50 20:50 06:05 


 


WBC  11.28 H    (4.0-11.0)  K/uL


 


RBC  4.63    (4.30-5.90)  M/uL


 


Hgb  11.6 L   10.9 L  (12.0-16.0)  g/dL


 


Hct  35.3 L   34.4 L  (36.0-46.0)  %


 


MCV  76.2 L    (80.0-98.0)  fL


 


MCH  25.1 L    (27.0-32.0)  pg


 


MCHC  32.9    (31.0-37.0)  g/dL


 


RDW Std Deviation  44.5    (28.0-62.0)  fl


 


RDW Coeff of Burton  16 H    (11.0-15.0)  %


 


Plt Count  186    (150-400)  K/uL


 


MPV  10.80    (7.40-12.00)  fL


 


Nucleated RBC %  0.0    /100WBC


 


Nucleated RBCs #  0    K/uL


 


Blood Type   O NEGATIVE   


 


Antibody Screen   POSITIVE   


 


Antibody Identification   Inconclusive   











Med Orders - Current: 


 Current Medications





Acetaminophen (Tylenol Extra Strength)  500 mg PO Q4H PRN


   PRN Reason: Pain


Acetaminophen (Tylenol Extra Strength)  1,000 mg PO Q4H PRN


   PRN Reason: Pain


Benzocaine/Menthol (Dermoplast Pain Relief 20%-0.5% Spray)  78 gm TOP 

ASDIRECTED PRN


   PRN Reason: Perineal Comfort Measure


   Last Admin: 19 22:25 Dose:  1 can


Bisacodyl (Dulcolax)  10 mg RECTAL ONETIME PRN


   PRN Reason: Constipation


Butorphanol Tartrate (Stadol)  1 mg IVPUSH Q1H PRN


   PRN Reason: Pain


Carboprost Tromethamine (Hemabate Ds)  250 mcg IM ASDIRECTED PRN


   PRN Reason: Post Partum Hemorrhage


Docusate Sodium (Colace)  100 mg PO BID PRN


   PRN Reason: Constipation


Emollient Ointment (Lansinoh Hpa)  0 gm TOP ASDIRECTED PRN


   PRN Reason: Sore Nipples


   Last Admin: 19 22:24 Dose:  7 gm


Lactated Ringer's (Ringers, Lactated)  1,000 mls @ 150 mls/hr IV ASDIRECTED NATASHA


Oxytocin/Sodium Chloride (Oxytocin 30 Unit/500 Ml-Ns)  30 unit in 500 mls @ 500 

mls/hr IV TITRATE NATASHA


Tranexamic Acid 1,000 mg/ (Sodium Chloride)  110 mls @ 660 mls/hr IV ONETIME PRN


   PRN Reason: Bleeding


Ibuprofen (Motrin)  400 mg PO Q4H PRN


   PRN Reason: Pain


Ibuprofen (Motrin)  800 mg PO Q6H PRN


   PRN Reason: Pain


   Last Admin: 19 09:36 Dose:  800 mg


Lidocaine HCl (Xylocaine 1%)  50 ml INJECT ONETIME PRN


   PRN Reason: Laceration repair


Methylergonovine Maleate (Methergine)  0.2 mg IM ASDIRECTED PRN


   PRN Reason: Post Partum Hemorrhage


Misoprostol (Cytotec)  200 mcg PO ONETIME PRN


   PRN Reason: Post Partum Hemorrhage


Nalbuphine HCl (Nubain)  10 mg IVPUSH Q1H PRN


   PRN Reason: Pain (severe 7-10)


Oxycodone HCl (Oxycodone)  5 mg PO Q2H PRN


   PRN Reason: Pain


Sodium Chloride (Saline Flush)  10 ml FLUSH ASDIRECTED PRN


   PRN Reason: Keep Vein Open


Sodium Chloride (Saline Flush)  2.5 ml FLUSH ASDIRECTED PRN


   PRN Reason: Keep Vein Open


Sterile Water (Sterile Water For Irrigation)  1,000 ml IRR ASDIRECTED PRN


   PRN Reason: delivery


Witch Hazel (Tucks)  1 pad TOP ASDIRECTED PRN


   PRN Reason: comfort care





Discontinued Medications





Oxytocin (Pitocin) Confirm Administered Dose 10 unit .ROUTE .STK-MED ONE


   Stop: 19 19:56


   Last Admin: 19 20:18 Dose:  10 unit











- Infant Interaction


Infant Disposition, Postpartum:  in Room with Family


Infant Interaction: Holding Infant


Infant Feeding: Attempted Breastfeeding; Nursed Fair/Poor


Support Person: , Mother





- Postpartum Recovery Exam


Fundal Tone: Firm


Fundal Level: At Umbilicus


Fundal Placement: Midline


Lochia Amount: Scant


Lochia Color: Rubra/Red


Perineum Description: Intact, Minimal Bruising/Swelling


Episiotomy/Laceration: None


Bladder Status: Voiding





- Exam


General: Alert, Oriented


HEENT: Pupils Equal


Neck: Supple


Lungs: Clear to Auscultation, Normal Respiratory Effort


Cardiovascular: Regular Rate, Regular Rhythm


GI/Abdominal Exam: Normal Bowel Sounds, Soft, Non-Tender, No Organomegaly, No 

Distention, No Abnormal Bruit, No Mass, Pelvis Stable


Extremities: Normal Inspection, Normal Range of Motion, Non-Tender, No Pedal 

Edema, Normal Capillary Refill


Skin: Warm, Dry, Intact


Wound/Incisions: Healing Well


Neurological: No New Focal Deficit


Psy/Mental Status: Alert, Normal Affect, Normal Mood





- Problem List Review


Problem List Initiated/Reviewed/Updated: Yes





- My Orders


Last 24 Hours: 


My Active Orders





19 20:14


Patient Status [ADT] Routine 


May Shower [RC] ASDIRECTED 


Up ad Mary [RC] ASDIRECTED 


Vital Signs [RC] PER UNIT ROUTINE 


Acetaminophen [Tylenol Extra Strength]   1,000 mg PO Q4H PRN 


Acetaminophen [Tylenol Extra Strength]   500 mg PO Q4H PRN 


Benzocaine/Menthol [Dermoplast Pain Relief 20%-0.5% Spray]   78 gm TOP 

ASDIRECTED PRN 


Bisacodyl [Dulcolax]   10 mg RECTAL ONETIME PRN 


Docusate Sodium [Colace]   100 mg PO BID PRN 


Ibuprofen [Motrin]   400 mg PO Q4H PRN 


Ibuprofen [Motrin]   800 mg PO Q6H PRN 


Lanolin [Lansinoh HPA]   See Dose Instructions  TOP ASDIRECTED PRN 


Witch Hazel [Tucks]   1 pad TOP ASDIRECTED PRN 


oxyCODONE   5 mg PO Q2H PRN 


Assess Lochia [WOMSER] Per Unit Routine 


Assess Uterine Involution [WOMSER] Per Unit Routine 


Peripheral IV Discontinue [OM.PC] Routine 





19 Breakfast


Regular Diet [DIET]

## 2020-03-07 ENCOUNTER — HOSPITAL ENCOUNTER (INPATIENT)
Dept: HOSPITAL 56 - MW.OBCHECK | Age: 30
LOS: 1 days | Discharge: HOME | End: 2020-03-08
Attending: OBSTETRICS & GYNECOLOGY | Admitting: OBSTETRICS & GYNECOLOGY
Payer: COMMERCIAL

## 2020-03-07 DIAGNOSIS — O48.0: Primary | ICD-10-CM

## 2020-03-07 DIAGNOSIS — Z3A.40: ICD-10-CM

## 2020-03-07 PROCEDURE — 3E033VJ INTRODUCTION OF OTHER HORMONE INTO PERIPHERAL VEIN, PERCUTANEOUS APPROACH: ICD-10-PCS | Performed by: OBSTETRICS & GYNECOLOGY

## 2020-03-07 NOTE — PCM.DEL
L & D Note





- General Info


Date of Service: 20


Mother's Due Date: 20





- Delivery Note


Labor: Augmented by Oxytocin


Delivery Outcome: Livebirth


Infant Delivery Method: Spontaneous Vaginal Delivery-Single


Birth Presentation: Vertex


Nuchal Cord: Present


Anesthesia Type: None


Amniotic Fluid Description: Clear


Episiotomy Type: None


Laceration: None


Placenta: Intact, Spontaneous


Cord: 3 Vessels


: Bulb Syringe, Stimulated


APGAR Score 1 min: 8


APGAR Score 5 min: 9





- General Info


Date of Service: 20





- Patient Data


Weight - Most Recent: 89.811 kg


Lab Results Last 24 Hours: 


 Laboratory Results - last 24 hr











  20 Range/Units





  11:50 13:26 13:26 


 


WBC   14.65 H   (4.0-11.0)  K/uL


 


RBC   4.79   (4.30-5.90)  M/uL


 


Hgb   12.4   (12.0-16.0)  g/dL


 


Hct   39.3   (36.0-46.0)  %


 


MCV   82.0   (80.0-98.0)  fL


 


MCH   25.9 L   (27.0-32.0)  pg


 


MCHC   31.6   (31.0-37.0)  g/dL


 


RDW Std Deviation   47.2   (28.0-62.0)  fl


 


RDW Coeff of Burton   16 H   (11.0-15.0)  %


 


Plt Count   222   (150-400)  K/uL


 


MPV   10.50   (7.40-12.00)  fL


 


Nucleated RBC %   0.0   /100WBC


 


Nucleated RBCs #   0   K/uL


 


Fetal Membrane Rupture  POSITIVE    


 


Blood Type    O NEGATIVE  


 


Antibody Screen    NEGATIVE  











Med Orders - Current: 


 Current Medications





Acetaminophen (Tylenol Extra Strength)  1,000 mg PO Q6H PRN


   PRN Reason: Pain


Benzocaine/Menthol (Dermoplast Pain Relief 20%-0.5% Spray)  78 gm TOP 

ASDIRECTED PRN


   PRN Reason: Perineal Comfort Measure


Bisacodyl (Dulcolax)  10 mg RECTAL ONETIME PRN


   PRN Reason: Constipation


Butorphanol Tartrate (Stadol)  1 mg IVPUSH Q1H PRN


   PRN Reason: Pain


Carboprost Tromethamine (Hemabate Ds)  250 mcg IM ASDIRECTED PRN


   PRN Reason: Post Partum Hemorrhage


Docusate Sodium (Colace)  100 mg PO BID PRN


   PRN Reason: Constipation


Emollient Ointment (Lansinoh Hpa)  0 gm TOP ASDIRECTED PRN


   PRN Reason: Sore Nipples


Tranexamic Acid 1,000 mg/ (Sodium Chloride)  110 mls @ 660 mls/hr IV ONETIME PRN


   PRN Reason: Bleeding


Lactated Ringer's (Ringers, Lactated)  1,000 mls @ 150 mls/hr IV ASDIRECTED NATASHA


   Last Admin: 20 13:36 Dose:  150 mls/hr


Oxytocin/Sodium Chloride (Oxytocin 30 Unit/500 Ml-Ns)  30 unit in 500 mls @ 500 

mls/hr IV TITRATE NATASHA


Oxytocin/Sodium Chloride (Oxytocin 30 Unit/500 Ml-Ns)  30 unit in 500 mls @ 2 

mls/hr IV TITRATE [facility]; Protocol


   Last Admin: 20 13:37 Dose:  2 munits/min, 2 mls/hr


Ibuprofen (Motrin)  800 mg PO Q8H PRN


   PRN Reason: Pain


Lidocaine HCl (Xylocaine 1%)  50 ml INJECT ONETIME PRN


   PRN Reason: Laceration repair


Methylergonovine Maleate (Methergine)  0.2 mg IM ASDIRECTED PRN


   PRN Reason: Post Partum Hemorrhage


Misoprostol (Cytotec)  200 mcg PO ONETIME PRN


   PRN Reason: Post Partum Hemorrhage


Nalbuphine HCl (Nubain)  10 mg IVPUSH Q1H PRN


   PRN Reason: Pain (severe 7-10)


Oxycodone HCl (Oxycodone)  5 mg PO Q2H PRN


   PRN Reason: Pain


Sodium Chloride (Saline Flush)  10 ml FLUSH ASDIRECTED PRN


   PRN Reason: Keep Vein Open


Sodium Chloride (Saline Flush)  2.5 ml FLUSH ASDIRECTED PRN


   PRN Reason: Keep Vein Open


Sodium Chloride (Normal Saline)  10 ml IV ASDIRECTED PRN


   PRN Reason: IV Use


Sterile Water (Sterile Water For Irrigation)  1,000 ml IRR ASDIRECTED PRN


   PRN Reason: delivery


Terbutaline Sulfate (Brethine)  0.25 mg SUBCUT ASDIRECTED PRN


   PRN Reason: Tacysystole


Witch Hazel (Tucks)  1 pad TOP ASDIRECTED PRN


   PRN Reason: comfort care











- Problem List & Annotations


(1) Vaginal delivery


SNOMED Code(s): 484404554


   Code(s): O80 - ENCOUNTER FOR FULL-TERM UNCOMPLICATED DELIVERY   Status: 

Acute   Current Visit: Yes   





- Problem List Review


Problem List Initiated/Reviewed/Updated: Yes





- My Orders


Last 24 Hours: 


My Active Orders





20 12:04


Fetal Non Stress Test [RC] PER UNIT ROUTINE 


Up ad Mary [RC] ASDIRECTED 


Vaginal Exam [RC] Click to Edit 


Vital Signs [RC] PER UNIT ROUTINE 


Resuscitation Status Routine 





20 12:51


Patient Status [ADT] Routine 


Bedrest Bathroom Privileges [RC] ASDIRECTED 


Communication Order [RC] ASDIRECTED 


Communication Order [RC] ASDIRECTED 


Fetal Heart Tones [RC] CONTINUOUS 


Fetal Non Stress Test [RC] PER UNIT ROUTINE 


May Shower [RC] ASDIRECTED 


Notify Provider [RC] PRN 


Notify Provider [RC] PRN 


Notify Provider [RC] STAT 


Oxygen Therapy [RC] ASDIRECTED 


Up ad Mary [RC] ASDIRECTED 


Vaginal Exam [RC] PRN 


Vaginal Exam [RC] PRN 


Vital Signs [RC] PER UNIT ROUTINE 


Vital Signs [RC] PER UNIT ROUTINE 


Butorphanol [Stadol]   1 mg IVPUSH Q1H PRN 


Carboprost Tromethamine [Hemabate DS]   250 mcg IM ASDIRECTED PRN 


Lidocaine 1% [Xylocaine 1%]   50 ml INJECT ONETIME PRN 


Methylergonovine [Methergine]   0.2 mg IM ASDIRECTED PRN 


Nalbuphine [Nubain]   10 mg IVPUSH Q1H PRN 


Sodium Chloride 0.9% [Normal Saline]   10 ml IV ASDIRECTED PRN 


Sodium Chloride 0.9% [Saline Flush]   10 ml FLUSH ASDIRECTED PRN 


Sodium Chloride 0.9% [Saline Flush]   2.5 ml FLUSH ASDIRECTED PRN 


Terbutaline [Brethine]   0.25 mg SUBCUT ASDIRECTED PRN 


Tranexamic Acid [Cyklokapron] 1,000 mg   Sodium Chloride 0.9% [Normal Saline] 

100 ml IV ONETIME 


Water For Irrigation,Sterile [Sterile Water for Irrigation]   1,000 ml IRR 

ASDIRECTED PRN 


miSOPROStoL [Cytotec]   200 mcg PO ONETIME PRN 


Fetal Scalp Electrode [WOMSER] Per Unit Routine 


Peripheral IV Insertion Adult [OM.PC] Routine 





20 13:00


Lactated Ringers [Ringers, Lactated] 1,000 ml IV ASDIRECTED 


Oxytocin/0.9 % Sodium Chloride [Oxytocin 30 Unit/500 ML-NS] 30 unit in 500 ml 

IV TITRATE 


Oxytocin/0.9 % Sodium Chloride [Oxytocin 30 Unit/500 ML-NS] 30 unit in 500 ml 

IV TITRATE 


Medication Administration Instruction [OM.PC] Q3H 





20 13:26


RPR (SYPHILIS SERO) W/ RFLX [REF] Routine 





20 16:45


Patient Status [ADT] Routine 


May Shower [RC] ASDIRECTED 


Notify Provider Vital Signs [RC] ASDIRECTED 


Up ad Mary [RC] ASDIRECTED 


Vital Signs [RC] PER UNIT ROUTINE 


RHIG WORKUP, POSTPARTUM [BBK] Routine 


Acetaminophen [Tylenol Extra Strength]   1,000 mg PO Q6H PRN 


Benzocaine/Menthol [Dermoplast Pain Relief 20%-0.5% Spray]   78 gm TOP 

ASDIRECTED PRN 


Docusate Sodium [Colace]   100 mg PO BID PRN 


Ibuprofen [Motrin]   800 mg PO Q8H PRN 


Lanolin [Lansinoh HPA]   See Dose Instructions  TOP ASDIRECTED PRN 


bisacodyL [Dulcolax]   10 mg RECTAL ONETIME PRN 


oxyCODONE   5 mg PO Q2H PRN 


witch hazeL [Tucks]   1 pad TOP ASDIRECTED PRN 


Assess Lochia [WOMSER] Per Unit Routine 


Assess Uterine Involution [WOMSER] Per Unit Routine 


Breast Pump [WOMSER] Per Unit Routine 


Peripheral IV Discontinue [OM.PC] Routine 





20 16:46


Cooling Warming Measures [RC] ASDIRECTED 


Ice Therapy [OM.PC] Per Unit Routine 


Perineal Care [OM.PC] Per Unit Routine 


Sitz Bath [OM.PC] Per Unit Routine 





20 Dinner


Regular Diet [DIET] 





20 05:11


HEMOGLOBIN/HEMATOCRIT,HH [HEME] Timed 














- Assessment


Assessment:: 





28yo  s/p  at 40w0d





- Plan


Plan:: 





Admit to postpartum unit for routine postpartum care. Rh studies due to Rh 

negative.

## 2020-03-07 NOTE — OR
SURGEON:

Talisha Cm MD

 

DATE OF PROCEDURE:  2020

 

PREOPERATIVE DIAGNOSES:

1. A 29-year-old, P3, P2-0-0-2, at 40 weeks and 0 day's gestation.

2. Spontaneous rupture of membranes.

3. Group B Streptococcus negative.

 

POSTOPERATIVE DIAGNOSES:

1. A 29-year-old G3, P3-0-0-3, status post spontaneous vaginal delivery at 40

    weeks and 0 day's gestation.

2. Group B Streptococcus negative.

 

PROCEDURE:

Spontaneous vaginal delivery.

 

PRIMARY SURGEON:

Talisha Cm MD.

 

ANESTHESIA:

None.

 

ESTIMATED BLOOD LOSS:

300 mL.

 

FINDINGS:

Live female infant in cephalic presentation.  Apgar score 9 and 9 at one and

five minutes respectively.  Placenta intact and with 3-vessel cord.  Nuchal cord

x1.  No lacerations.

 

INDICATIONS:

This is a 29-year-old, G3, P2-0-0-2, who presented at 40 weeks and 0 days

gestation after rupture of membranes at home at roughly 2 o'clock in the

morning.  Upon presentation, she was found to be 1 to 2 cm dilated and was not

keyon.  She was begun on Pitocin for induction of labor after premature

rupture of membranes.  She progressed to complete cervical dilation, and I was

called to the room.

 

DESCRIPTION OF PROCEDURE:

The patient progressed to complete cervical dilation.  The patient began pushing

and the head was quickly delivered followed by the shoulders and remainder of

the body.  The nuchal cord x1 was reduced after delivery of the body.  The

infant was placed on the maternal abdomen.  After the cord stopped pulsating,

the cord was clamped and cut.  The placenta then delivered intact and with 3-

vessel cord via the Trammell-Christianson maneuver.  The perineum was inspected.  No

lacerations were noted.  The fundus was firm with minimal lochia.  Estimated

blood loss 300 mL.  The patient and the infant tolerated the delivery well.

 

 

[id number] / MODL

DD:  2020 16:52:50

DT:  2020 17:54:38

Job #:  [id number]

MTDD

## 2020-03-08 VITALS — HEART RATE: 101 BPM | DIASTOLIC BLOOD PRESSURE: 68 MMHG | SYSTOLIC BLOOD PRESSURE: 126 MMHG

## 2020-03-08 NOTE — PCM.PNPP
- General Info


Date of Service: 20


Admission Dx/Problem (Free Text): 





Patient doing well, no complaints today. Breastfeeding going well.


Functional Status: Reports: Pain Controlled, Tolerating Diet, Ambulating, 

Urinating





- Review of Systems


General: Reports: No Symptoms


HEENT: Reports: No Symptoms


Pulmonary: Reports: No Symptoms


Cardiovascular: Reports: No Symptoms


Gastrointestinal: Reports: No Symptoms


Genitourinary: Reports: No Symptoms


Musculoskeletal: Reports: No Symptoms


Skin: Reports: No Symptoms


Neurological: Reports: No Symptoms


Psychiatric: Reports: No Symptoms





- Patient Data


Vital Signs - Most Recent: 


 Last Vital Signs











Temp  36.6 C   20 08:00


 


Pulse  102 H  20 08:00


 


Resp  18   20 08:00


 


BP  121/68   20 08:00


 


Pulse Ox  97   20 08:00











Weight - Most Recent: 89.811 kg


I&O - Last 24 Hours: 


 Intake & Output











 20





 21:59 06:59 14:59


 


Intake Total   2


 


Balance   2











Lab Results - Last 24 Hours: 


 Laboratory Results - last 24 hr











  20 Range/Units





  11:50 13:26 13:26 


 


WBC   14.65 H   (4.0-11.0)  K/uL


 


RBC   4.79   (4.30-5.90)  M/uL


 


Hgb   12.4   (12.0-16.0)  g/dL


 


Hct   39.3   (36.0-46.0)  %


 


MCV   82.0   (80.0-98.0)  fL


 


MCH   25.9 L   (27.0-32.0)  pg


 


MCHC   31.6   (31.0-37.0)  g/dL


 


RDW Std Deviation   47.2   (28.0-62.0)  fl


 


RDW Coeff of Burton   16 H   (11.0-15.0)  %


 


Plt Count   222   (150-400)  K/uL


 


MPV   10.50   (7.40-12.00)  fL


 


Nucleated RBC %   0.0   /100WBC


 


Nucleated RBCs #   0   K/uL


 


Fetal Membrane Rupture  POSITIVE    


 


Blood Type    O NEGATIVE  


 


Antibody Screen    NEGATIVE  


 


Fetal Screen     (NEGATIVE)  


 


RhIG Candidate?     


 


Rhogam Indicated     














  20 Range/Units





  17:19 06:05 


 


WBC    (4.0-11.0)  K/uL


 


RBC    (4.30-5.90)  M/uL


 


Hgb   12.3  (12.0-16.0)  g/dL


 


Hct   39.0  (36.0-46.0)  %


 


MCV    (80.0-98.0)  fL


 


MCH    (27.0-32.0)  pg


 


MCHC    (31.0-37.0)  g/dL


 


RDW Std Deviation    (28.0-62.0)  fl


 


RDW Coeff of Burton    (11.0-15.0)  %


 


Plt Count    (150-400)  K/uL


 


MPV    (7.40-12.00)  fL


 


Nucleated RBC %    /100WBC


 


Nucleated RBCs #    K/uL


 


Fetal Membrane Rupture    


 


Blood Type    


 


Antibody Screen    


 


Fetal Screen  NEGATIVE   (NEGATIVE)  


 


RhIG Candidate?  YES   


 


Rhogam Indicated  YES, BABY RH POS H   











Med Orders - Current: 


 Current Medications





Acetaminophen (Tylenol Extra Strength)  1,000 mg PO Q6H PRN


   PRN Reason: Pain


Benzocaine/Menthol (Dermoplast Pain Relief 20%-0.5% Spray)  78 gm TOP 

ASDIRECTED PRN


   PRN Reason: Perineal Comfort Measure


   Last Admin: 20 20:00 Dose:  1 can


Bisacodyl (Dulcolax)  10 mg RECTAL ONETIME PRN


   PRN Reason: Constipation


Butorphanol Tartrate (Stadol)  1 mg IVPUSH Q1H PRN


   PRN Reason: Pain


Carboprost Tromethamine (Hemabate Ds)  250 mcg IM ASDIRECTED PRN


   PRN Reason: Post Partum Hemorrhage


Docusate Sodium (Colace)  100 mg PO BID PRN


   PRN Reason: Constipation


Emollient Ointment (Lansinoh Hpa)  0 gm TOP ASDIRECTED PRN


   PRN Reason: Sore Nipples


   Last Admin: 20 19:58 Dose:  7 gram


Tranexamic Acid 1,000 mg/ (Sodium Chloride)  110 mls @ 660 mls/hr IV ONETIME PRN


   PRN Reason: Bleeding


Lactated Ringer's (Ringers, Lactated)  1,000 mls @ 150 mls/hr IV ASDIRECTED NATASHA


   Last Admin: 20 13:36 Dose:  150 mls/hr


Oxytocin/Sodium Chloride (Oxytocin 30 Unit/500 Ml-Ns)  30 unit in 500 mls @ 500 

mls/hr IV TITRATE NATASHA


   Last Admin: 20 18:04 Dose:  500 mls/hr


Oxytocin/Sodium Chloride (Oxytocin 30 Unit/500 Ml-Ns)  30 unit in 500 mls @ 2 

mls/hr IV TITRATE NATASHA; Protocol


   Last Admin: 20 13:37 Dose:  2 munits/min, 2 mls/hr


Ibuprofen (Motrin)  800 mg PO Q8H PRN


   PRN Reason: Pain


   Last Admin: 20 06:02 Dose:  800 mg


Lidocaine HCl (Xylocaine 1%)  50 ml INJECT ONETIME PRN


   PRN Reason: Laceration repair


Methylergonovine Maleate (Methergine)  0.2 mg IM ASDIRECTED PRN


   PRN Reason: Post Partum Hemorrhage


   Last Admin: 20 17:38 Dose:  0.2 mg


Misoprostol (Cytotec)  200 mcg PO ONETIME PRN


   PRN Reason: Post Partum Hemorrhage


Nalbuphine HCl (Nubain)  10 mg IVPUSH Q1H PRN


   PRN Reason: Pain (severe 7-10)


Oxycodone HCl (Oxycodone)  5 mg PO Q2H PRN


   PRN Reason: Pain


Sodium Chloride (Saline Flush)  10 ml FLUSH ASDIRECTED PRN


   PRN Reason: Keep Vein Open


Sodium Chloride (Saline Flush)  2.5 ml FLUSH ASDIRECTED PRN


   PRN Reason: Keep Vein Open


Sodium Chloride (Normal Saline)  10 ml IV ASDIRECTED PRN


   PRN Reason: IV Use


Sterile Water (Sterile Water For Irrigation)  1,000 ml IRR ASDIRECTED PRN


   PRN Reason: delivery


Terbutaline Sulfate (Brethine)  0.25 mg SUBCUT ASDIRECTED PRN


   PRN Reason: Tacysystole


Witch Hazel (Tucks)  1 pad TOP ASDIRECTED PRN


   PRN Reason: comfort care


   Last Admin: 20 19:59 Dose:  1 tub











- Infant Interaction


Infant Disposition, Postpartum:  at Bedside


Infant Feeding: Attempted Breastfeeding; Nursed Fair/Poor


Support Person: 





- Postpartum Recovery Exam


Fundal Tone: Firm


Fundal Level: 1 Fingerbreadths Below Umbilicus


Fundal Placement: Midline


Lochia Amount: Scant


Lochia Color: Rubra/Red


Bladder Status: Voiding





- Exam


General: Alert, Oriented


Neck: Supple


Lungs: Clear to Auscultation, Normal Respiratory Effort


Cardiovascular: Regular Rate, Regular Rhythm


GI/Abdominal Exam: Soft, Non-Tender


Extremities: Non-Tender


Skin: Warm


Neurological: No New Focal Deficit


Psy/Mental Status: Alert, Normal Affect, Normal Mood





- Problem List & Annotations


(1) Vaginal delivery


SNOMED Code(s): 209276825


   Code(s): O80 - ENCOUNTER FOR FULL-TERM UNCOMPLICATED DELIVERY   Status: 

Acute   Current Visit: Yes   





- Problem List Review


Problem List Initiated/Reviewed/Updated: Yes





- My Orders


Last 24 Hours: 


My Active Orders





20 12:04


Up ad Mary [RC] ASDIRECTED 


Vital Signs [RC] PER UNIT ROUTINE 


Resuscitation Status Routine 





20 12:51


Patient Status [ADT] Routine 


May Shower [RC] ASDIRECTED 


Up ad Mary [RC] ASDIRECTED 


Vital Signs [RC] PER UNIT ROUTINE 


Vital Signs [RC] PER UNIT ROUTINE 


Butorphanol [Stadol]   1 mg IVPUSH Q1H PRN 


Carboprost Tromethamine [Hemabate DS]   250 mcg IM ASDIRECTED PRN 


Lidocaine 1% [Xylocaine 1%]   50 ml INJECT ONETIME PRN 


Methylergonovine [Methergine]   0.2 mg IM ASDIRECTED PRN 


Nalbuphine [Nubain]   10 mg IVPUSH Q1H PRN 


Sodium Chloride 0.9% [Normal Saline]   10 ml IV ASDIRECTED PRN 


Sodium Chloride 0.9% [Saline Flush]   10 ml FLUSH ASDIRECTED PRN 


Sodium Chloride 0.9% [Saline Flush]   2.5 ml FLUSH ASDIRECTED PRN 


Terbutaline [Brethine]   0.25 mg SUBCUT ASDIRECTED PRN 


Tranexamic Acid [Cyklokapron] 1,000 mg   Sodium Chloride 0.9% [Normal Saline] 

100 ml IV ONETIME 


Water For Irrigation,Sterile [Sterile Water for Irrigation]   1,000 ml IRR 

ASDIRECTED PRN 


miSOPROStoL [Cytotec]   200 mcg PO ONETIME PRN 


Fetal Scalp Electrode [WOMSER] Per Unit Routine 


Peripheral IV Insertion Adult [OM.PC] Routine 





20 13:00


Lactated Ringers [Ringers, Lactated] 1,000 ml IV ASDIRECTED 


Oxytocin/0.9 % Sodium Chloride [Oxytocin 30 Unit/500 ML-NS] 30 unit in 500 ml 

IV TITRATE 


Oxytocin/0.9 % Sodium Chloride [Oxytocin 30 Unit/500 ML-NS] 30 unit in 500 ml 

IV TITRATE 


Medication Administration Instruction [OM.PC] Q3H 





20 13:26


RPR (SYPHILIS SERO) W/ RFLX [REF] Routine 





20 16:45


Patient Status [ADT] Routine 


Notify Provider Vital Signs [RC] ASDIRECTED 


Acetaminophen [Tylenol Extra Strength]   1,000 mg PO Q6H PRN 


Benzocaine/Menthol [Dermoplast Pain Relief 20%-0.5% Spray]   78 gm TOP 

ASDIRECTED PRN 


Docusate Sodium [Colace]   100 mg PO BID PRN 


Ibuprofen [Motrin]   800 mg PO Q8H PRN 


Lanolin [Lansinoh HPA]   See Dose Instructions  TOP ASDIRECTED PRN 


bisacodyL [Dulcolax]   10 mg RECTAL ONETIME PRN 


oxyCODONE   5 mg PO Q2H PRN 


witch hazeL [Tucks]   1 pad TOP ASDIRECTED PRN 


Assess Lochia [WOMSER] Per Unit Routine 


Assess Uterine Involution [WOMSER] Per Unit Routine 


Breast Pump [WOMSER] Per Unit Routine 


Peripheral IV Discontinue [OM.PC] Routine 





20 16:46


Ice Therapy [OM.PC] Per Unit Routine 


Perineal Care [OM.PC] Per Unit Routine 


Sitz Bath [OM.PC] Per Unit Routine 





20 Dinner


Regular Diet [DIET] 





20 12:25


Ready for Discharge [RC] PER UNIT ROUTINE 














- Assessment


Assessment:: 





28yo  s/p  at 40w0d, PPD#1.





- Plan


Plan:: 





Patient desires discharge home today. Has received Rhogam. Reviewed discharge 

instructions. All questions answered.

## 2020-09-25 NOTE — PCM.OPNOTE
- General Post-Op/Procedure Note


Date of Surgery/Procedure: 09/25/20


Operative Procedure(s): Loop electrosurgical excision Procedure


Findings: 


Normal sized anteverted uterus


 Cervix stained with lugol's iodine showed tan stain on the ectocervix about 

0.5cm from the cervical os (circumferential) 


Specimen taken in 3 passes


 2posterior passes ( 1 upper and 1 lower)


1 anterior pass 





Pre Op Diagnosis: CALDERON 2


Post-Op Diagnosis: same


Anesthesia Technique: General ET Tube


Primary Surgeon: Cherie Sen


Anesthesia Provider: Irving MOYER Split


Fluid Replacement, Intraop: 1,000


EBL in mLs: 5


Complications: None


Condition: Good


Free Text/Narrative:: 


                                 Intake & Output











 09/24/20 09/25/20 09/25/20





 22:59 06:59 14:59


 


Intake Total   1300


 


Balance   1300

## 2020-09-25 NOTE — PCM.PREANE
Preanesthetic Assessment





- Anesthesia/Transfusion/Family Hx


Anesthesia History: Prior Anesthesia Without Reaction


Family History of Anesthesia Reaction: No


Transfusion History: No Prior Transfusion(s)





- Review of Systems


General: No Symptoms


Pulmonary: No Symptoms


Cardiovascular: No Symptoms


Gastrointestinal: No Symptoms


Neurological: No Symptoms


Other: Reports: None





- Physical Assessment


NPO Status Date: 09/24/20


Height: 5 ft 7 in


Weight: 97.522 kg


ASA Class: 1


Mental Status: Alert & Oriented x3


Airway Class: Mallampati = 2


Dentition: Reports: Normal Dentition


ROM/Head Extension: Full


Lungs: Clear to Auscultation, Normal Respiratory Effort


Cardiovascular: Regular Rate, Regular Rhythm





- Lab


Values: 





                             Laboratory Last Values











WBC  5.31 K/uL (4.0-11.0)   09/25/20  07:40    


 


RBC  4.88 M/uL (4.30-5.90)   09/25/20  07:40    


 


Hgb  13.5 g/dL (12.0-16.0)   09/25/20  07:40    


 


Hct  43.1 % (36.0-46.0)   09/25/20  07:40    


 


MCV  88.3 fL (80.0-98.0)   09/25/20  07:40    


 


MCH  27.7 pg (27.0-32.0)   09/25/20  07:40    


 


MCHC  31.3 g/dL (31.0-37.0)   09/25/20  07:40    


 


RDW Std Deviation  43.5 fl (28.0-62.0)   09/25/20  07:40    


 


RDW Coeff of Burton  13 % (11.0-15.0)   09/25/20  07:40    


 


Plt Count  209 K/uL (150-400)   09/25/20  07:40    


 


MPV  10.50 fL (7.40-12.00)   09/25/20  07:40    


 


Nucleated RBC %  0.0 /100WBC  09/25/20  07:40    


 


Nucleated RBCs #  0 K/uL  09/25/20  07:40    


 


SARS-CoV-2 RNA (EDUARDO)  NEGATIVE  (NEGATIVE)   09/25/20  06:30    














- Allergies


Allergies/Adverse Reactions: 


                                    Allergies











Allergy/AdvReac Type Severity Reaction Status Date / Time


 


No Known Allergies Allergy   Verified 09/23/20 14:25














- Blood


Blood Available: No





- Anesthesia Plan


Pre-Op Medication Ordered: None





- Acknowledgements


Anesthesia Type Planned: General Anesthesia


Pt an Appropriate Candidate for the Planned Anesthesia: Yes


Alternatives and Risks of Anesthesia Discussed w Pt/Guardian: Yes


Pt/Guardian Understands and Agrees with Anesthesia Plan: Yes





PreAnesthesia Questionnaire


OB/GYN History: Reports: Pregnancy


Musculoskeletal History: Reports: Fracture


Other Musculoskeletal History: hx of fx finger


Endocrine/Metabolic History: Reports: Obesity/BMI 30+





- Infectious Disease History


Infectious Disease History: Reports: Chicken Pox, Mononucleosis





- Past Surgical History


Head Surgeries/Procedures: Reports: None


HEENT Surgical History: Reports: Oral Surgery


Other HEENT Surgeries/Procedures: removal of wisdom teeth


Female  Surgical History: Reports: Breast Biopsy, Breast Implant


Musculoskeletal Surgical History: Reports: Shoulder Surgery


Other Musculoskeletal Surgeries/Procedures:: Labrim repair and RTCR right 

shoulder- has screw in right shoulder (plastic?)





- SUBSTANCE USE


Smoking Status *Q: Never Smoker


Recreational Drug Use History: No





- HOME MEDS


Home Medications: 


                                    Home Meds





. [No Known Home Meds]  09/23/20 [History]











- CURRENT (IN HOUSE) MEDS


Current Meds: 





                               Current Medications





Lactated Ringer's (Ringers, Lactated)  1,000 mls @ 125 mls/hr IV ASDIRECTED NATASHA


Sodium Chloride (Saline Flush)  10 ml FLUSH ASDIRECTED PRN


   PRN Reason: Keep Vein Open


Sodium Chloride (Saline Flush)  2.5 ml FLUSH ASDIRECTED PRN


   PRN Reason: Keep Vein Open


Sodium Chloride (Normal Saline)  10 ml IV ASDIRECTED PRN


   PRN Reason: IV Use





Discontinued Medications





Dexamethasone (Dexamethasone) Confirm Administered Dose 20 mg .ROUTE .STK-MED 

ONE


   Stop: 09/25/20 07:31


Fentanyl (Sublimaze) Confirm Administered Dose 100 mcg .ROUTE .STK-MED ONE


   Stop: 09/25/20 07:30


Ketorolac Tromethamine (Toradol) Confirm Administered Dose 30 mg .ROUTE .STK-MED

 ONE


   Stop: 09/25/20 07:31


Lidocaine (Xylocaine-Mpf 2%) Confirm Administered Dose 5 ml .ROUTE .STK-MED ONE


   Stop: 09/25/20 07:31


Lidocaine/Epinephrine (Xylocaine 1% With Epinephrine 1:100,000) Confirm 

Administered Dose 20 ml .ROUTE .STK-MED ONE


   Stop: 09/25/20 07:32


Midazolam HCl (Versed 1 Mg/Ml) Confirm Administered Dose 2 mg .ROUTE .STK-MED 

ONE


   Stop: 09/25/20 07:31


Ondansetron HCl (Zofran) Confirm Administered Dose 4 mg .ROUTE .STK-MED ONE


   Stop: 09/25/20 07:31


Propofol (Diprivan  20 Ml) Confirm Administered Dose 400 mg .ROUTE .STK-MED ONE


   Stop: 09/25/20 07:30

## 2020-09-25 NOTE — [id number]
Post Anesthesia Note





- EVALUATION WITHIN 48HRS OF ANESTHETIC


Vital Signs in Normal Range: Yes


Patient Participated in Evaluation: Yes


Respiratory Function Stable: Yes


Airway Patent: Yes


Cardiovascular Function Stable: Yes


Hydration Status Stable: Yes


Pain Control Satisfactory: Yes


Nausea and Vomiting Control Satisfactory: Yes


Mental Status Recovered: Yes


Vital Signs: 


                                Last Vital Signs











Temp  96.4 F L  09/25/20 09:15


 


Pulse  67   09/25/20 09:15


 


Resp  15   09/25/20 09:15


 


BP  123/45 L  09/25/20 09:15


 


Pulse Ox  100   09/25/20 09:15

## 2020-09-29 NOTE — OR
SURGEON:

JOSSELYN SEN

 

DATE OF PROCEDURE:  2020

 

PREOPERATIVE DIAGNOSES:

A 29-year-old  with cervical intraepithelial neoplasia 2.

 

POSTOPERATIVE DIAGNOSIS:

A 29-year-old   with cervical intraepithelial neoplasia 2.

 

PROCEDURES:

1. Loop electrosurgical excision procedure.

2. Endocervical curetting.

 

PRIMARY SURGEON:

Josselyn Sen MD

 

ANESTHESIA:

General.

 

ESTIMATED BLOOD LOSS:

Minimal.

 

INTRAVENOUS FLUID:

1000 mL of LR.



SPECIMENS:

Three, one was the top cervical excision of the cervix, and we had two posterior

portions, one was the middle, and one was the lower portion of the cervix, and

ECC was also obtained.



FINDINGS:

The pertinent findings on bimanual exam revealed a normal-sized midline uterus

with no adnexal masses.  The cervix did not appear to have any gross masses.

Lugol solution revealed areas of non-uptake around the entire squamocolumnar

junction.



INDICATIONS FOR THE PROCEDURE:

The patient is a 29-year-old  3, para 3, with a history of LGSIL Pap. She

had a colposcopy done, which showed CALDERON 2.  ECC was negative.  The patient was

informed that she would need to undergo a LEEP procedure with the ECC.  All

risks, benefits, and alternatives were discussed with the patient, including the

potential risk of  labor, cervical incompetence, premature rupture of

membrane, and may become pregnant in the future.  The patient states she is not

concerned about this because her  has a vasectomy.  She accepted the

risks.  All questions were answered, and the patient was taken to the operating

room in stable condition.

 



 

DESCRIPTION OF THE PROCEDURE:

The patient was taken to the operating room, where she was placed under general

anesthesia without difficulty.  She was prepared and draped in the usual sterile

fashion and placed in the dorsolithotomy position.  A preoperative bimanual exam

revealed the above-noted finding.  At that time, an insulated speculum was

placed into the vagina.  Lugol's solution was used to paint along the entire

cervix and vaginal wall.  An area of non-uptake was noted to be around the

entire squamocolumnar junction.  Then, approximately 2 mL of  Pitressin with 
Lidocaine  was

 was injected circumferentially around the cervix.  Then, a large loop electrode
was used to remove the anterior and also the posterior

portion of the cervix in 3 passes.  ECC was then obtained.  The base of the

cervix was then coagulated with the rollerball electrode, and the base was also

covered with Monsel's.  Next, ECC was obtained prior to coagulating the base.

Hemostasis was noted.  All instruments were removed from the vagina.  The

patient tolerated the procedure and was sent to revive in recovery room in

stable condition.

 

 

AMERICO WALKER

DD:  2020 12:08:09

DT:  2020 19:34:45

Job #:  [id number]

MTDD